# Patient Record
Sex: FEMALE | Race: WHITE | NOT HISPANIC OR LATINO | URBAN - METROPOLITAN AREA
[De-identification: names, ages, dates, MRNs, and addresses within clinical notes are randomized per-mention and may not be internally consistent; named-entity substitution may affect disease eponyms.]

---

## 2021-12-06 LAB
ABO (EXTERNAL): NORMAL
HEPATITIS B SURFACE ANTIGEN (EXTERNAL): NONREACTIVE
RH (EXTERNAL): POSITIVE
RUBELLA ANTIBODY IGG (EXTERNAL): NORMAL

## 2022-06-23 LAB — GROUP B STREPTOCOCCUS (EXTERNAL): NEGATIVE

## 2022-07-09 ENCOUNTER — HOSPITAL ENCOUNTER (INPATIENT)
Facility: CLINIC | Age: 27
LOS: 3 days | Discharge: HOME | End: 2022-07-12
Attending: SPECIALIST | Admitting: SPECIALIST
Payer: COMMERCIAL

## 2022-07-09 ENCOUNTER — ANESTHESIA EVENT (OUTPATIENT)
Dept: OBGYN | Facility: CLINIC | Age: 27
End: 2022-07-09
Payer: COMMERCIAL

## 2022-07-09 ENCOUNTER — ANESTHESIA (OUTPATIENT)
Dept: OBGYN | Facility: CLINIC | Age: 27
End: 2022-07-09
Payer: COMMERCIAL

## 2022-07-09 DIAGNOSIS — Z98.891 STATUS POST C-SECTION: ICD-10-CM

## 2022-07-09 DIAGNOSIS — O92.6 GALACTORRHEA ASSOCIATED WITH CHILDBIRTH: Primary | ICD-10-CM

## 2022-07-09 PROBLEM — Z37.9 NORMAL LABOR: Status: ACTIVE | Noted: 2022-07-09

## 2022-07-09 LAB
ABO/RH(D): NORMAL
ANTIBODY SCREEN: NEGATIVE
HGB BLD-MCNC: 12.1 G/DL (ref 11.7–15.7)
SPECIMEN EXPIRATION DATE: NORMAL

## 2022-07-09 PROCEDURE — 272N000001 HC OR GENERAL SUPPLY STERILE: Performed by: SPECIALIST

## 2022-07-09 PROCEDURE — 250N000013 HC RX MED GY IP 250 OP 250 PS 637

## 2022-07-09 PROCEDURE — 250N000013 HC RX MED GY IP 250 OP 250 PS 637: Performed by: SPECIALIST

## 2022-07-09 PROCEDURE — 250N000011 HC RX IP 250 OP 636: Performed by: ANESTHESIOLOGY

## 2022-07-09 PROCEDURE — 360N000076 HC SURGERY LEVEL 3, PER MIN: Performed by: SPECIALIST

## 2022-07-09 PROCEDURE — 250N000011 HC RX IP 250 OP 636: Performed by: NURSE ANESTHETIST, CERTIFIED REGISTERED

## 2022-07-09 PROCEDURE — 59025 FETAL NON-STRESS TEST: CPT

## 2022-07-09 PROCEDURE — 0HQ7XZZ REPAIR ABDOMEN SKIN, EXTERNAL APPROACH: ICD-10-PCS | Performed by: SPECIALIST

## 2022-07-09 PROCEDURE — 36415 COLL VENOUS BLD VENIPUNCTURE: CPT | Performed by: SPECIALIST

## 2022-07-09 PROCEDURE — G0463 HOSPITAL OUTPT CLINIC VISIT: HCPCS | Mod: 25

## 2022-07-09 PROCEDURE — 250N000011 HC RX IP 250 OP 636

## 2022-07-09 PROCEDURE — 120N000012 HC R&B POSTPARTUM

## 2022-07-09 PROCEDURE — 250N000009 HC RX 250: Performed by: SPECIALIST

## 2022-07-09 PROCEDURE — 250N000011 HC RX IP 250 OP 636: Performed by: SPECIALIST

## 2022-07-09 PROCEDURE — 710N000009 HC RECOVERY PHASE 1, LEVEL 1, PER MIN: Performed by: SPECIALIST

## 2022-07-09 PROCEDURE — 250N000009 HC RX 250: Performed by: NURSE ANESTHETIST, CERTIFIED REGISTERED

## 2022-07-09 PROCEDURE — 85018 HEMOGLOBIN: CPT | Performed by: SPECIALIST

## 2022-07-09 PROCEDURE — 999N000016 HC STATISTIC ATTENDANCE AT DELIVERY

## 2022-07-09 PROCEDURE — 258N000003 HC RX IP 258 OP 636: Performed by: NURSE ANESTHETIST, CERTIFIED REGISTERED

## 2022-07-09 PROCEDURE — 86780 TREPONEMA PALLIDUM: CPT | Performed by: SPECIALIST

## 2022-07-09 PROCEDURE — 00HU33Z INSERTION OF INFUSION DEVICE INTO SPINAL CANAL, PERCUTANEOUS APPROACH: ICD-10-PCS | Performed by: ANESTHESIOLOGY

## 2022-07-09 PROCEDURE — 258N000003 HC RX IP 258 OP 636: Performed by: SPECIALIST

## 2022-07-09 PROCEDURE — 86901 BLOOD TYPING SEROLOGIC RH(D): CPT | Performed by: SPECIALIST

## 2022-07-09 PROCEDURE — 3E0R3BZ INTRODUCTION OF ANESTHETIC AGENT INTO SPINAL CANAL, PERCUTANEOUS APPROACH: ICD-10-PCS | Performed by: ANESTHESIOLOGY

## 2022-07-09 PROCEDURE — 370N000017 HC ANESTHESIA TECHNICAL FEE, PER MIN: Performed by: SPECIALIST

## 2022-07-09 RX ORDER — OXYTOCIN 10 [USP'U]/ML
10 INJECTION, SOLUTION INTRAMUSCULAR; INTRAVENOUS
Status: DISCONTINUED | OUTPATIENT
Start: 2022-07-09 | End: 2022-07-12 | Stop reason: HOSPADM

## 2022-07-09 RX ORDER — MISOPROSTOL 200 UG/1
400 TABLET ORAL
Status: DISCONTINUED | OUTPATIENT
Start: 2022-07-09 | End: 2022-07-09 | Stop reason: HOSPADM

## 2022-07-09 RX ORDER — OXYTOCIN 10 [USP'U]/ML
10 INJECTION, SOLUTION INTRAMUSCULAR; INTRAVENOUS
Status: DISCONTINUED | OUTPATIENT
Start: 2022-07-09 | End: 2022-07-09 | Stop reason: HOSPADM

## 2022-07-09 RX ORDER — AZITHROMYCIN 500 MG/1
500 INJECTION, POWDER, LYOPHILIZED, FOR SOLUTION INTRAVENOUS
Status: COMPLETED | OUTPATIENT
Start: 2022-07-09 | End: 2022-07-09

## 2022-07-09 RX ORDER — LIDOCAINE 40 MG/G
CREAM TOPICAL
Status: DISCONTINUED | OUTPATIENT
Start: 2022-07-09 | End: 2022-07-09 | Stop reason: HOSPADM

## 2022-07-09 RX ORDER — DEXTROSE, SODIUM CHLORIDE, SODIUM LACTATE, POTASSIUM CHLORIDE, AND CALCIUM CHLORIDE 5; .6; .31; .03; .02 G/100ML; G/100ML; G/100ML; G/100ML; G/100ML
INJECTION, SOLUTION INTRAVENOUS CONTINUOUS
Status: DISCONTINUED | OUTPATIENT
Start: 2022-07-09 | End: 2022-07-12 | Stop reason: HOSPADM

## 2022-07-09 RX ORDER — ALBUTEROL SULFATE 90 UG/1
2 AEROSOL, METERED RESPIRATORY (INHALATION) EVERY 6 HOURS
COMMUNITY

## 2022-07-09 RX ORDER — MORPHINE SULFATE 1 MG/ML
INJECTION, SOLUTION EPIDURAL; INTRATHECAL; INTRAVENOUS PRN
Status: DISCONTINUED | OUTPATIENT
Start: 2022-07-09 | End: 2022-07-09

## 2022-07-09 RX ORDER — ONDANSETRON 2 MG/ML
4 INJECTION INTRAMUSCULAR; INTRAVENOUS EVERY 6 HOURS PRN
Status: DISCONTINUED | OUTPATIENT
Start: 2022-07-09 | End: 2022-07-09 | Stop reason: HOSPADM

## 2022-07-09 RX ORDER — MISOPROSTOL 200 UG/1
400 TABLET ORAL
Status: DISCONTINUED | OUTPATIENT
Start: 2022-07-09 | End: 2022-07-12 | Stop reason: HOSPADM

## 2022-07-09 RX ORDER — KETOROLAC TROMETHAMINE 30 MG/ML
30 INJECTION, SOLUTION INTRAMUSCULAR; INTRAVENOUS EVERY 6 HOURS
Status: COMPLETED | OUTPATIENT
Start: 2022-07-10 | End: 2022-07-10

## 2022-07-09 RX ORDER — PROCHLORPERAZINE 25 MG
25 SUPPOSITORY, RECTAL RECTAL EVERY 12 HOURS PRN
Status: DISCONTINUED | OUTPATIENT
Start: 2022-07-09 | End: 2022-07-09 | Stop reason: HOSPADM

## 2022-07-09 RX ORDER — ROPIVACAINE HYDROCHLORIDE 2 MG/ML
10 INJECTION, SOLUTION EPIDURAL; INFILTRATION; PERINEURAL ONCE
Status: DISCONTINUED | OUTPATIENT
Start: 2022-07-09 | End: 2022-07-09 | Stop reason: HOSPADM

## 2022-07-09 RX ORDER — PRENATAL VIT/IRON FUM/FOLIC AC 27MG-0.8MG
1 TABLET ORAL DAILY
COMMUNITY

## 2022-07-09 RX ORDER — METOCLOPRAMIDE HYDROCHLORIDE 5 MG/ML
10 INJECTION INTRAMUSCULAR; INTRAVENOUS EVERY 6 HOURS PRN
Status: DISCONTINUED | OUTPATIENT
Start: 2022-07-09 | End: 2022-07-09 | Stop reason: HOSPADM

## 2022-07-09 RX ORDER — ONDANSETRON 2 MG/ML
INJECTION INTRAMUSCULAR; INTRAVENOUS PRN
Status: DISCONTINUED | OUTPATIENT
Start: 2022-07-09 | End: 2022-07-09

## 2022-07-09 RX ORDER — METOCLOPRAMIDE 10 MG/1
10 TABLET ORAL EVERY 6 HOURS PRN
Status: DISCONTINUED | OUTPATIENT
Start: 2022-07-09 | End: 2022-07-09 | Stop reason: HOSPADM

## 2022-07-09 RX ORDER — NALOXONE HYDROCHLORIDE 0.4 MG/ML
0.2 INJECTION, SOLUTION INTRAMUSCULAR; INTRAVENOUS; SUBCUTANEOUS
Status: DISCONTINUED | OUTPATIENT
Start: 2022-07-09 | End: 2022-07-09 | Stop reason: HOSPADM

## 2022-07-09 RX ORDER — MISOPROSTOL 200 UG/1
800 TABLET ORAL
Status: DISCONTINUED | OUTPATIENT
Start: 2022-07-09 | End: 2022-07-09 | Stop reason: HOSPADM

## 2022-07-09 RX ORDER — IBUPROFEN 400 MG/1
800 TABLET, FILM COATED ORAL
Status: DISCONTINUED | OUTPATIENT
Start: 2022-07-09 | End: 2022-07-12 | Stop reason: HOSPADM

## 2022-07-09 RX ORDER — OXYTOCIN/0.9 % SODIUM CHLORIDE 30/500 ML
100-340 PLASTIC BAG, INJECTION (ML) INTRAVENOUS CONTINUOUS PRN
Status: DISCONTINUED | OUTPATIENT
Start: 2022-07-09 | End: 2022-07-12 | Stop reason: HOSPADM

## 2022-07-09 RX ORDER — METOCLOPRAMIDE HYDROCHLORIDE 5 MG/ML
10 INJECTION INTRAMUSCULAR; INTRAVENOUS EVERY 6 HOURS PRN
Status: DISCONTINUED | OUTPATIENT
Start: 2022-07-09 | End: 2022-07-12 | Stop reason: HOSPADM

## 2022-07-09 RX ORDER — OXYTOCIN/0.9 % SODIUM CHLORIDE 30/500 ML
340 PLASTIC BAG, INJECTION (ML) INTRAVENOUS CONTINUOUS PRN
Status: DISCONTINUED | OUTPATIENT
Start: 2022-07-09 | End: 2022-07-12 | Stop reason: HOSPADM

## 2022-07-09 RX ORDER — TRANEXAMIC ACID 10 MG/ML
1 INJECTION, SOLUTION INTRAVENOUS EVERY 30 MIN PRN
Status: DISCONTINUED | OUTPATIENT
Start: 2022-07-09 | End: 2022-07-12 | Stop reason: HOSPADM

## 2022-07-09 RX ORDER — LIDOCAINE 40 MG/G
CREAM TOPICAL
Status: DISCONTINUED | OUTPATIENT
Start: 2022-07-09 | End: 2022-07-12 | Stop reason: HOSPADM

## 2022-07-09 RX ORDER — KETOROLAC TROMETHAMINE 30 MG/ML
30 INJECTION, SOLUTION INTRAMUSCULAR; INTRAVENOUS
Status: DISCONTINUED | OUTPATIENT
Start: 2022-07-09 | End: 2022-07-12 | Stop reason: HOSPADM

## 2022-07-09 RX ORDER — MODIFIED LANOLIN
OINTMENT (GRAM) TOPICAL
Status: DISCONTINUED | OUTPATIENT
Start: 2022-07-09 | End: 2022-07-12 | Stop reason: HOSPADM

## 2022-07-09 RX ORDER — CEFAZOLIN SODIUM 2 G/100ML
2 INJECTION, SOLUTION INTRAVENOUS SEE ADMIN INSTRUCTIONS
Status: DISCONTINUED | OUTPATIENT
Start: 2022-07-09 | End: 2022-07-09 | Stop reason: HOSPADM

## 2022-07-09 RX ORDER — ONDANSETRON 4 MG/1
4 TABLET, ORALLY DISINTEGRATING ORAL EVERY 6 HOURS PRN
Status: DISCONTINUED | OUTPATIENT
Start: 2022-07-09 | End: 2022-07-09 | Stop reason: HOSPADM

## 2022-07-09 RX ORDER — OXYTOCIN/0.9 % SODIUM CHLORIDE 30/500 ML
340 PLASTIC BAG, INJECTION (ML) INTRAVENOUS CONTINUOUS PRN
Status: DISCONTINUED | OUTPATIENT
Start: 2022-07-09 | End: 2022-07-09 | Stop reason: HOSPADM

## 2022-07-09 RX ORDER — NALOXONE HYDROCHLORIDE 0.4 MG/ML
0.4 INJECTION, SOLUTION INTRAMUSCULAR; INTRAVENOUS; SUBCUTANEOUS
Status: DISCONTINUED | OUTPATIENT
Start: 2022-07-09 | End: 2022-07-12 | Stop reason: HOSPADM

## 2022-07-09 RX ORDER — METHYLERGONOVINE MALEATE 0.2 MG/ML
200 INJECTION INTRAVENOUS
Status: DISCONTINUED | OUTPATIENT
Start: 2022-07-09 | End: 2022-07-12 | Stop reason: HOSPADM

## 2022-07-09 RX ORDER — TRANEXAMIC ACID 10 MG/ML
1 INJECTION, SOLUTION INTRAVENOUS EVERY 30 MIN PRN
Status: DISCONTINUED | OUTPATIENT
Start: 2022-07-09 | End: 2022-07-09 | Stop reason: HOSPADM

## 2022-07-09 RX ORDER — AMOXICILLIN 250 MG
2 CAPSULE ORAL 2 TIMES DAILY
Status: DISCONTINUED | OUTPATIENT
Start: 2022-07-09 | End: 2022-07-12 | Stop reason: HOSPADM

## 2022-07-09 RX ORDER — FENTANYL CITRATE 50 UG/ML
INJECTION, SOLUTION INTRAMUSCULAR; INTRAVENOUS
Status: COMPLETED
Start: 2022-07-09 | End: 2022-07-09

## 2022-07-09 RX ORDER — FENTANYL CITRATE 50 UG/ML
50-100 INJECTION, SOLUTION INTRAMUSCULAR; INTRAVENOUS
Status: DISCONTINUED | OUTPATIENT
Start: 2022-07-09 | End: 2022-07-12 | Stop reason: HOSPADM

## 2022-07-09 RX ORDER — PROCHLORPERAZINE 25 MG
25 SUPPOSITORY, RECTAL RECTAL EVERY 12 HOURS PRN
Status: DISCONTINUED | OUTPATIENT
Start: 2022-07-09 | End: 2022-07-12 | Stop reason: HOSPADM

## 2022-07-09 RX ORDER — CARBOPROST TROMETHAMINE 250 UG/ML
250 INJECTION, SOLUTION INTRAMUSCULAR
Status: DISCONTINUED | OUTPATIENT
Start: 2022-07-09 | End: 2022-07-12 | Stop reason: HOSPADM

## 2022-07-09 RX ORDER — CITRIC ACID/SODIUM CITRATE 334-500MG
30 SOLUTION, ORAL ORAL
Status: COMPLETED | OUTPATIENT
Start: 2022-07-09 | End: 2022-07-09

## 2022-07-09 RX ORDER — AZITHROMYCIN 500 MG/1
INJECTION, POWDER, LYOPHILIZED, FOR SOLUTION INTRAVENOUS
Status: DISCONTINUED
Start: 2022-07-09 | End: 2022-07-09 | Stop reason: HOSPADM

## 2022-07-09 RX ORDER — METHYLERGONOVINE MALEATE 0.2 MG/ML
200 INJECTION INTRAVENOUS
Status: DISCONTINUED | OUTPATIENT
Start: 2022-07-09 | End: 2022-07-09 | Stop reason: HOSPADM

## 2022-07-09 RX ORDER — ACETAMINOPHEN 325 MG/1
975 TABLET ORAL EVERY 6 HOURS
Status: DISCONTINUED | OUTPATIENT
Start: 2022-07-09 | End: 2022-07-12 | Stop reason: HOSPADM

## 2022-07-09 RX ORDER — LIDOCAINE HCL/EPINEPHRINE/PF 2%-1:200K
VIAL (ML) INJECTION PRN
Status: DISCONTINUED | OUTPATIENT
Start: 2022-07-09 | End: 2022-07-09

## 2022-07-09 RX ORDER — NALOXONE HYDROCHLORIDE 0.4 MG/ML
0.4 INJECTION, SOLUTION INTRAMUSCULAR; INTRAVENOUS; SUBCUTANEOUS
Status: DISCONTINUED | OUTPATIENT
Start: 2022-07-09 | End: 2022-07-09 | Stop reason: HOSPADM

## 2022-07-09 RX ORDER — ROPIVACAINE HYDROCHLORIDE 2 MG/ML
INJECTION, SOLUTION EPIDURAL; INFILTRATION; PERINEURAL
Status: COMPLETED | OUTPATIENT
Start: 2022-07-09 | End: 2022-07-09

## 2022-07-09 RX ORDER — CEFAZOLIN SODIUM 2 G/100ML
INJECTION, SOLUTION INTRAVENOUS
Status: DISCONTINUED
Start: 2022-07-09 | End: 2022-07-09 | Stop reason: HOSPADM

## 2022-07-09 RX ORDER — HYDROCORTISONE 25 MG/G
CREAM TOPICAL 3 TIMES DAILY PRN
Status: DISCONTINUED | OUTPATIENT
Start: 2022-07-09 | End: 2022-07-12 | Stop reason: HOSPADM

## 2022-07-09 RX ORDER — FAMOTIDINE 20 MG/1
20 TABLET, FILM COATED ORAL 2 TIMES DAILY
COMMUNITY

## 2022-07-09 RX ORDER — NALOXONE HYDROCHLORIDE 0.4 MG/ML
0.2 INJECTION, SOLUTION INTRAMUSCULAR; INTRAVENOUS; SUBCUTANEOUS
Status: DISCONTINUED | OUTPATIENT
Start: 2022-07-09 | End: 2022-07-12 | Stop reason: HOSPADM

## 2022-07-09 RX ORDER — HYDROMORPHONE HCL IN WATER/PF 6 MG/30 ML
.3-.5 PATIENT CONTROLLED ANALGESIA SYRINGE INTRAVENOUS EVERY 30 MIN PRN
Status: DISCONTINUED | OUTPATIENT
Start: 2022-07-09 | End: 2022-07-12 | Stop reason: HOSPADM

## 2022-07-09 RX ORDER — ONDANSETRON 2 MG/ML
4 INJECTION INTRAMUSCULAR; INTRAVENOUS EVERY 6 HOURS PRN
Status: DISCONTINUED | OUTPATIENT
Start: 2022-07-09 | End: 2022-07-12 | Stop reason: HOSPADM

## 2022-07-09 RX ORDER — PROCHLORPERAZINE MALEATE 5 MG
10 TABLET ORAL EVERY 6 HOURS PRN
Status: DISCONTINUED | OUTPATIENT
Start: 2022-07-09 | End: 2022-07-09 | Stop reason: HOSPADM

## 2022-07-09 RX ORDER — ACETAMINOPHEN 325 MG/1
TABLET ORAL
Status: COMPLETED
Start: 2022-07-09 | End: 2022-07-09

## 2022-07-09 RX ORDER — ONDANSETRON 4 MG/1
4 TABLET, ORALLY DISINTEGRATING ORAL EVERY 6 HOURS PRN
Status: DISCONTINUED | OUTPATIENT
Start: 2022-07-09 | End: 2022-07-12 | Stop reason: HOSPADM

## 2022-07-09 RX ORDER — CEFAZOLIN SODIUM 2 G/100ML
2 INJECTION, SOLUTION INTRAVENOUS
Status: COMPLETED | OUTPATIENT
Start: 2022-07-09 | End: 2022-07-09

## 2022-07-09 RX ORDER — FENTANYL CITRATE 50 UG/ML
INJECTION, SOLUTION INTRAMUSCULAR; INTRAVENOUS
Status: COMPLETED | OUTPATIENT
Start: 2022-07-09 | End: 2022-07-09

## 2022-07-09 RX ORDER — AMOXICILLIN 250 MG
1 CAPSULE ORAL 2 TIMES DAILY
Status: DISCONTINUED | OUTPATIENT
Start: 2022-07-09 | End: 2022-07-12 | Stop reason: HOSPADM

## 2022-07-09 RX ORDER — IBUPROFEN 400 MG/1
800 TABLET, FILM COATED ORAL EVERY 6 HOURS PRN
Status: DISCONTINUED | OUTPATIENT
Start: 2022-07-11 | End: 2022-07-12 | Stop reason: HOSPADM

## 2022-07-09 RX ORDER — EPHEDRINE SULFATE 50 MG/ML
5 INJECTION, SOLUTION INTRAMUSCULAR; INTRAVENOUS; SUBCUTANEOUS
Status: DISCONTINUED | OUTPATIENT
Start: 2022-07-09 | End: 2022-07-09 | Stop reason: HOSPADM

## 2022-07-09 RX ORDER — CARBOPROST TROMETHAMINE 250 UG/ML
250 INJECTION, SOLUTION INTRAMUSCULAR
Status: DISCONTINUED | OUTPATIENT
Start: 2022-07-09 | End: 2022-07-09 | Stop reason: HOSPADM

## 2022-07-09 RX ORDER — MISOPROSTOL 200 UG/1
800 TABLET ORAL
Status: DISCONTINUED | OUTPATIENT
Start: 2022-07-09 | End: 2022-07-12 | Stop reason: HOSPADM

## 2022-07-09 RX ORDER — METOCLOPRAMIDE 10 MG/1
10 TABLET ORAL EVERY 6 HOURS PRN
Status: DISCONTINUED | OUTPATIENT
Start: 2022-07-09 | End: 2022-07-12 | Stop reason: HOSPADM

## 2022-07-09 RX ORDER — OXYCODONE HYDROCHLORIDE 5 MG/1
5 TABLET ORAL EVERY 4 HOURS PRN
Status: DISCONTINUED | OUTPATIENT
Start: 2022-07-09 | End: 2022-07-12 | Stop reason: HOSPADM

## 2022-07-09 RX ORDER — SIMETHICONE 80 MG
80 TABLET,CHEWABLE ORAL 4 TIMES DAILY PRN
Status: DISCONTINUED | OUTPATIENT
Start: 2022-07-09 | End: 2022-07-12 | Stop reason: HOSPADM

## 2022-07-09 RX ORDER — FENTANYL CITRATE 50 UG/ML
100 INJECTION, SOLUTION INTRAMUSCULAR; INTRAVENOUS ONCE
Status: DISCONTINUED | OUTPATIENT
Start: 2022-07-09 | End: 2022-07-09 | Stop reason: HOSPADM

## 2022-07-09 RX ORDER — ACETAMINOPHEN 325 MG/1
650 TABLET ORAL EVERY 4 HOURS PRN
Status: DISCONTINUED | OUTPATIENT
Start: 2022-07-12 | End: 2022-07-12 | Stop reason: HOSPADM

## 2022-07-09 RX ORDER — CITRIC ACID/SODIUM CITRATE 334-500MG
30 SOLUTION, ORAL ORAL
Status: DISCONTINUED | OUTPATIENT
Start: 2022-07-09 | End: 2022-07-09 | Stop reason: HOSPADM

## 2022-07-09 RX ORDER — PROCHLORPERAZINE MALEATE 10 MG
10 TABLET ORAL EVERY 6 HOURS PRN
Status: DISCONTINUED | OUTPATIENT
Start: 2022-07-09 | End: 2022-07-12 | Stop reason: HOSPADM

## 2022-07-09 RX ORDER — MINOCYCLINE HYDROCHLORIDE 100 MG/1
100 TABLET ORAL 2 TIMES DAILY
Status: ON HOLD | COMMUNITY
End: 2022-07-12

## 2022-07-09 RX ORDER — BISACODYL 10 MG
10 SUPPOSITORY, RECTAL RECTAL DAILY PRN
Status: DISCONTINUED | OUTPATIENT
Start: 2022-07-11 | End: 2022-07-12 | Stop reason: HOSPADM

## 2022-07-09 RX ORDER — SODIUM CHLORIDE, SODIUM LACTATE, POTASSIUM CHLORIDE, CALCIUM CHLORIDE 600; 310; 30; 20 MG/100ML; MG/100ML; MG/100ML; MG/100ML
INJECTION, SOLUTION INTRAVENOUS CONTINUOUS
Status: DISCONTINUED | OUTPATIENT
Start: 2022-07-09 | End: 2022-07-09 | Stop reason: HOSPADM

## 2022-07-09 RX ORDER — NALBUPHINE HYDROCHLORIDE 10 MG/ML
2.5-5 INJECTION, SOLUTION INTRAMUSCULAR; INTRAVENOUS; SUBCUTANEOUS EVERY 6 HOURS PRN
Status: DISCONTINUED | OUTPATIENT
Start: 2022-07-09 | End: 2022-07-12 | Stop reason: HOSPADM

## 2022-07-09 RX ADMIN — SODIUM CHLORIDE, SODIUM LACTATE, POTASSIUM CHLORIDE, CALCIUM CHLORIDE AND DEXTROSE MONOHYDRATE: 5; 600; 310; 30; 20 INJECTION, SOLUTION INTRAVENOUS at 23:22

## 2022-07-09 RX ADMIN — CEFAZOLIN SODIUM 2 G: 2 INJECTION, SOLUTION INTRAVENOUS at 19:05

## 2022-07-09 RX ADMIN — ONDANSETRON 4 MG: 2 INJECTION INTRAMUSCULAR; INTRAVENOUS at 19:18

## 2022-07-09 RX ADMIN — PHENYLEPHRINE HYDROCHLORIDE 0.3 MCG/KG/MIN: 10 INJECTION INTRAVENOUS at 19:05

## 2022-07-09 RX ADMIN — SODIUM CHLORIDE, POTASSIUM CHLORIDE, SODIUM LACTATE AND CALCIUM CHLORIDE 500 ML: 600; 310; 30; 20 INJECTION, SOLUTION INTRAVENOUS at 17:00

## 2022-07-09 RX ADMIN — ACETAMINOPHEN 975 MG: 325 TABLET ORAL at 22:12

## 2022-07-09 RX ADMIN — FENTANYL CITRATE 100 MCG: 50 INJECTION, SOLUTION INTRAMUSCULAR; INTRAVENOUS at 17:17

## 2022-07-09 RX ADMIN — SODIUM CHLORIDE, POTASSIUM CHLORIDE, SODIUM LACTATE AND CALCIUM CHLORIDE: 600; 310; 30; 20 INJECTION, SOLUTION INTRAVENOUS at 18:34

## 2022-07-09 RX ADMIN — Medication 340 ML/HR: at 19:21

## 2022-07-09 RX ADMIN — PHENYLEPHRINE HYDROCHLORIDE 100 MCG: 10 INJECTION INTRAVENOUS at 19:40

## 2022-07-09 RX ADMIN — Medication: at 17:48

## 2022-07-09 RX ADMIN — LIDOCAINE HYDROCHLORIDE,EPINEPHRINE BITARTRATE 5 ML: 20; .005 INJECTION, SOLUTION EPIDURAL; INFILTRATION; INTRACAUDAL; PERINEURAL at 19:06

## 2022-07-09 RX ADMIN — PHENYLEPHRINE HYDROCHLORIDE 100 MCG: 10 INJECTION INTRAVENOUS at 19:52

## 2022-07-09 RX ADMIN — FENTANYL CITRATE 100 MCG: 50 INJECTION, SOLUTION INTRAMUSCULAR; INTRAVENOUS at 17:55

## 2022-07-09 RX ADMIN — LIDOCAINE HYDROCHLORIDE,EPINEPHRINE BITARTRATE 10 ML: 20; .005 INJECTION, SOLUTION EPIDURAL; INFILTRATION; INTRACAUDAL; PERINEURAL at 18:57

## 2022-07-09 RX ADMIN — AZITHROMYCIN MONOHYDRATE 500 MG: 500 INJECTION, POWDER, LYOPHILIZED, FOR SOLUTION INTRAVENOUS at 18:46

## 2022-07-09 RX ADMIN — ROPIVACAINE HYDROCHLORIDE 10 ML: 2 INJECTION, SOLUTION EPIDURAL; INFILTRATION at 17:55

## 2022-07-09 RX ADMIN — AZITHROMYCIN 500 MG: 500 INJECTION, POWDER, LYOPHILIZED, FOR SOLUTION INTRAVENOUS at 18:46

## 2022-07-09 RX ADMIN — MORPHINE SULFATE 2 MG: 1 INJECTION, SOLUTION EPIDURAL; INTRATHECAL; INTRAVENOUS at 19:23

## 2022-07-09 RX ADMIN — SODIUM CITRATE AND CITRIC ACID MONOHYDRATE 30 ML: 500; 334 SOLUTION ORAL at 18:47

## 2022-07-09 ASSESSMENT — ACTIVITIES OF DAILY LIVING (ADL)
ADLS_ACUITY_SCORE: 31

## 2022-07-09 NOTE — PROGRESS NOTES
Patient received labor epidural and then SVE performed which revealed breech presentation.  Confirmed by ultrasound.  Patient's cervix is dilated to a rim.    I was called to assess the patient and I recommended a  birth.  The risks and benefits were discussed including but not limited to bleeding, infection, transfusion, injury to other organs, implications on future pregnancies. Informed consent obtained.    Heaven Moya MD ....................  2022   7:00 PM , pager: [phone]

## 2022-07-09 NOTE — H&P
OB ADMISSION NOTE    CHIEF COMPLAINT:   Presents to labor and delivery with SROM and painful contractions.    HISTORY OF PRESENT ILLNESS:  26 yo  at 38+5 weeks presents with SROM and painful contractions.  Her pregnancy was uncomplicated.  Her medical history includes MRSA colonized (3 positive tests).  She is on a klever chol of nasal minocycline and Hibiclens shower once a month. 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  Patient had COVID in mid-.    OBSTETRICAL / DATING HISTORY:  Estimated Date of Delivery: 2022  Gestational Age:  38w5d    OB History    Para Term  AB Living   1 0 0 0 0 0   SAB IAB Ectopic Multiple Live Births   0 0 0 0 0      # Outcome Date GA Lbr Wong/2nd Weight Sex Delivery Anes PTL Lv   1 Current                 LAB TESTING:  See prenatal records.      PAST MEDICAL HISTORY:  Past Medical History:   Diagnosis Date     Depressive disorder      Uncomplicated asthma        PAST SURGICAL HISTORY:  Past Surgical History:   Procedure Laterality Date     EYE SURGERY      Right eye surgery, blind in right eye       ALLERGIES: NKA    HABITS:  No tobacco/etoh/drugs    HISTORY OF PRESENT ILLNESS:    (Please see scanned  sheets for prenatal history. Examination at the time of admission revealed no interval change in the patient s history or physical exam except as described below.)    See above     REVIEW OF SYSTEMS:  NEUROLOGIC:  Negative, no headaches or visual changes  EYES:  Negative, no visual changes  ENT:  Negative, no ear pain,no loss of hearing.  GI:  Negative, no diarrhea or constipation  BREAST:  Negative, no breast pain  :  Negative, no dysuria  GYN:  Negative, no unusual vaginal discharge except amniotic fluid  CV:  Negative, no chest pain  PULMONARY:  Negative, no shortness of breath  MUSCULOSKELETAL:  Negative. No limb pain  PSYCH:  Negative, good mood  PHYSICAL EXAM:   /72   Temp 98.4  F (36.9  C)    "Ht 1.676 m (5' 6\")   Wt 73 kg (161 lb)   BMI 25.99 kg/m    Gen:  NAD, nontoxic, alert and oriented X 3  CV: regular rate, no murmurs  RESP:  Unlabored breathing, clear  ABDOMEN: gravid, nontender, no organomegaly appreciated  Membrane Status:  ruptured  Fetal Presentation:  vertex  Cervix:  3/90/0  GBS:  negative  EFW:  7-8#    Fetal heart tones: category 1 tracing  TOCO: irregular contractions    Impression:  IUP at 38w5d with with SROM and early labor  Patient Active Problem List   Diagnosis     Labor and delivery, indication for care     Normal labor       Plan:  Admission  Epidural when desired  Anticipate   Isolation precautions for MRSA  COVID - has had COVID within the last 90 days so no testing required.    Heaven Moya MD ....................  2022   4:45 PM , pager: [phone]    One hour spent on admission ... 30 minutes face to face and 30 minutes on documentation.    "

## 2022-07-09 NOTE — PLAN OF CARE
" presents at 38 5/7 weeks gestation after being diverted here from [facility]  c/o \"leaking fluid and contractions since 0500 \". External monitors applied after verbal consent by patient. Admission database obtained per patient and spouse. Patient signed consent to obtain prenatal records from [facility]. Vital signs obtained and WDL. Patient and spouse oriented to room, call light and plan of care while in the MAC.  "

## 2022-07-09 NOTE — PLAN OF CARE
All belongings gathered and taken by spouse. Patient and spouse escorted to room 220 for admission to labor and delivery. SBAR report to Caren CHAPPELL RN to asssume all cares for this patient.

## 2022-07-09 NOTE — PROVIDER NOTIFICATION
07/09/22 1545   Provider Notification   Provider Name/Title Dr. Moya   Method of Notification At Bedside   Request Evaluate in Person   Notification Reason Labor Status;Uterine Activity;Pain;Bleeding;SVE;Status Update   Patient to be admitted to labor and delivery.

## 2022-07-09 NOTE — ANESTHESIA PREPROCEDURE EVALUATION
Anesthesia Pre-Procedure Evaluation    Patient: Chandni Ryan   MRN: [id number] : 1995        Procedure :           Past Medical History:   Diagnosis Date     Depressive disorder      Uncomplicated asthma       Past Surgical History:   Procedure Laterality Date     EYE SURGERY      Right eye surgery, blind in right eye      No Known Allergies   Social History     Tobacco Use     Smoking status: Never Smoker     Smokeless tobacco: Never Used   Substance Use Topics     Alcohol use: Not Currently      Wt Readings from Last 1 Encounters:   22 73 kg (161 lb)        Anesthesia Evaluation            ROS/MED HX  ENT/Pulmonary:    (-) asthma   Neurologic:  - neg neurologic ROS     Cardiovascular:    (-) PIH   METS/Exercise Tolerance:     Hematologic:     (+) no anticoagulation therapy, no coagulopathy,     Musculoskeletal:       GI/Hepatic:     (+) GERD,     Renal/Genitourinary:       Endo:       Psychiatric/Substance Use:       Infectious Disease:       Malignancy:       Other:            Physical Exam    Airway        Mallampati: II   TM distance: > 3 FB   Neck ROM: full     Respiratory Devices and Support         Dental  no notable dental history         Cardiovascular   cardiovascular exam normal          Pulmonary   pulmonary exam normal                OUTSIDE LABS:  CBC:   Lab Results   Component Value Date    HGB 12.1 2022     BMP: No results found for: NA, POTASSIUM, CHLORIDE, CO2, BUN, CR, GLC  COAGS: No results found for: PTT, INR, FIBR  POC: No results found for: BGM, HCG, HCGS  HEPATIC: No results found for: ALBUMIN, PROTTOTAL, ALT, AST, GGT, ALKPHOS, BILITOTAL, BILIDIRECT, AJMIE  OTHER: No results found for: PH, LACT, A1C, FILIPE, PHOS, MAG, LIPASE, AMYLASE, TSH, T4, T3, CRP, SED    Anesthesia Plan    ASA Status:  2      Anesthesia Type: Epidural.              Consents    Anesthesia Plan(s) and associated risks, benefits, and realistic alternatives discussed. Questions answered and  patient/representative(s) expressed understanding.    - Discussed:     - Discussed with:  Patient         Postoperative Care            Comments:    Other Comments: Orders to manage the epidural infusion have been entered, and through coordination with the nurse, we will continute to manage and monitor the patient's labor epidural.  We will continuously be available to adjust as needed thruout the entire L&D process.             Alma Rosa Long MD, MD

## 2022-07-09 NOTE — ANESTHESIA PROCEDURE NOTES
Epidural catheter Procedure Note    Pre-Procedure   Staff -        Anesthesiologist:  Alma Rosa Long MD       Performed By: anesthesiologist       Location: OB       Pre-Anesthestic Checklist: patient identified, IV checked, risks and benefits discussed, informed consent, monitors and equipment checked, pre-op evaluation and at physician/surgeon's request  Timeout:       Correct Patient: Yes        Correct Procedure: Yes        Correct Site: Yes        Correct Position: Yes   Procedure Documentation  Procedure: epidural catheter       Patient Position: sitting       Patient Prep/Sterile Barriers: sterile gloves, mask, patient draped       Skin prep: Betadine       Local skin infiltrated with 3 mL of 1% lidocaine.        Insertion Site: L3-4. (midline approach).       Technique: LORT saline        OLINDA at 5 cm.       Needle Type: [employer] needle       Needle Gauge: 17.        Needle Length (Inches): 3.5        Catheter: 19 G.          Catheter threaded easily.         4 cm epidural space.         Threaded 9 cm at skin.         # of attempts: 1 and  # of redirects:          : 0.    Assessment/Narrative         Paresthesias: No.       Test dose of 3 mL lidocaine 1.5% w/ 1:200,000 epinephrine at.         Test dose negative, 3 minutes after injection, for signs of intravascular, subdural, or intrathecal injection.       Insertion/Infusion Method: LORT saline       Aspiration negative for Heme or CSF via Epidural Catheter.    Medication(s) Administered   0.2% Ropivacaine (Epidural) - EPIDURAL   10 mL - 7/9/2022 5:55:00 PM  Fentanyl PF (Epidural) - EPIDURAL   100 mcg - 7/9/2022 5:55:00 PM   Comments:  Pt tolerated well.   Immediately to supine with STFEFANY.   FHTs stable post-procedure.   No complications.

## 2022-07-10 LAB
HGB BLD-MCNC: 9.4 G/DL (ref 11.7–15.7)
T PALLIDUM AB SER QL: NONREACTIVE

## 2022-07-10 PROCEDURE — 250N000011 HC RX IP 250 OP 636: Performed by: SPECIALIST

## 2022-07-10 PROCEDURE — 120N000012 HC R&B POSTPARTUM

## 2022-07-10 PROCEDURE — 85018 HEMOGLOBIN: CPT | Performed by: SPECIALIST

## 2022-07-10 PROCEDURE — 36415 COLL VENOUS BLD VENIPUNCTURE: CPT | Performed by: SPECIALIST

## 2022-07-10 PROCEDURE — 250N000013 HC RX MED GY IP 250 OP 250 PS 637: Performed by: SPECIALIST

## 2022-07-10 RX ORDER — DIPHENHYDRAMINE HCL 25 MG
25 CAPSULE ORAL EVERY 6 HOURS PRN
Status: DISCONTINUED | OUTPATIENT
Start: 2022-07-10 | End: 2022-07-12 | Stop reason: HOSPADM

## 2022-07-10 RX ORDER — CETIRIZINE HYDROCHLORIDE 5 MG/1
5 TABLET ORAL DAILY
Status: DISCONTINUED | OUTPATIENT
Start: 2022-07-10 | End: 2022-07-12 | Stop reason: HOSPADM

## 2022-07-10 RX ADMIN — DIPHENHYDRAMINE HYDROCHLORIDE 25 MG: 25 CAPSULE ORAL at 10:40

## 2022-07-10 RX ADMIN — KETOROLAC TROMETHAMINE 30 MG: 30 INJECTION, SOLUTION INTRAMUSCULAR at 19:47

## 2022-07-10 RX ADMIN — SENNOSIDES AND DOCUSATE SODIUM 1 TABLET: 50; 8.6 TABLET ORAL at 08:48

## 2022-07-10 RX ADMIN — ACETAMINOPHEN 975 MG: 325 TABLET ORAL at 22:21

## 2022-07-10 RX ADMIN — ACETAMINOPHEN 975 MG: 325 TABLET ORAL at 10:40

## 2022-07-10 RX ADMIN — KETOROLAC TROMETHAMINE 30 MG: 30 INJECTION, SOLUTION INTRAMUSCULAR at 02:42

## 2022-07-10 RX ADMIN — ACETAMINOPHEN 975 MG: 325 TABLET ORAL at 16:41

## 2022-07-10 RX ADMIN — ONDANSETRON 4 MG: 2 INJECTION INTRAMUSCULAR; INTRAVENOUS at 10:34

## 2022-07-10 RX ADMIN — SENNOSIDES AND DOCUSATE SODIUM 1 TABLET: 50; 8.6 TABLET ORAL at 19:46

## 2022-07-10 RX ADMIN — KETOROLAC TROMETHAMINE 30 MG: 30 INJECTION, SOLUTION INTRAMUSCULAR at 08:48

## 2022-07-10 RX ADMIN — KETOROLAC TROMETHAMINE 30 MG: 30 INJECTION, SOLUTION INTRAMUSCULAR at 15:00

## 2022-07-10 RX ADMIN — ACETAMINOPHEN 975 MG: 325 TABLET ORAL at 04:15

## 2022-07-10 ASSESSMENT — ACTIVITIES OF DAILY LIVING (ADL)
WEAR_GLASSES_OR_BLIND: YES
ADLS_ACUITY_SCORE: 35
DOING_ERRANDS_INDEPENDENTLY_DIFFICULTY: NO
ADLS_ACUITY_SCORE: 35
TOILETING_ISSUES: NO
ADLS_ACUITY_SCORE: 20
ADLS_ACUITY_SCORE: 35
ADLS_ACUITY_SCORE: 20
DIFFICULTY_EATING/SWALLOWING: NO
ADLS_ACUITY_SCORE: 35
CONCENTRATING,_REMEMBERING_OR_MAKING_DECISIONS_DIFFICULTY: NO
ADLS_ACUITY_SCORE: 20
VISION_MANAGEMENT: GLASSES
ADLS_ACUITY_SCORE: 35
DIFFICULTY_COMMUNICATING: NO
ADLS_ACUITY_SCORE: 20
ADLS_ACUITY_SCORE: 35
HEARING_DIFFICULTY_OR_DEAF: NO
CHANGE_IN_FUNCTIONAL_STATUS_SINCE_ONSET_OF_CURRENT_ILLNESS/INJURY: NO
ADLS_ACUITY_SCORE: 31
FALL_HISTORY_WITHIN_LAST_SIX_MONTHS: NO
DRESSING/BATHING_DIFFICULTY: NO
ADLS_ACUITY_SCORE: 20
WALKING_OR_CLIMBING_STAIRS_DIFFICULTY: NO

## 2022-07-10 NOTE — PLAN OF CARE
Pt presents to labor and delivery from MAC, uncomfortable requesting epidural.  Category 1 tracing.  IV placed in right hand.  IV fentanyl 100mcg given to patient for pain.  Fluid bolus given.  Anes to bedside for epidural. Consents signed, timeout callled.  Ropivicaine and fentanyl given to Dr Long by handoff.  Epidural placed with good pain relief.   Mcmahan cath placed.  SVE 9-10cm.  ? Breech presentation.  Dr Drew called to bedside for ultrasound.  Confirmed breech.  Dr Moya called to hospital for c/s.  Pt prepped per protocol for c/s.  Consents signed.  Pt and spouse in agreement with POC.  Pt moved to OR via bed.  Pt prepped per protocol for c/s.  Report given to CHRISTOPH Tariq taking over.

## 2022-07-10 NOTE — PROVIDER NOTIFICATION
Drainage noted on left side of dressing during OR recovery. Drainage was marked and borders kept extending. Pressure applied with abd dressing and island dressing became saturated. Island dressing removed and pressure applied to incision with sterile abd dressing. Left side of incision noted to have moderate amts of serosanguinous drainage. Dr. Moya notified of above and plans to come in to assess. Plan to keep pt in recovery area until then. Pt and  updated on plan of care.

## 2022-07-10 NOTE — PLAN OF CARE
Patient admitted into room 433 per cart at 2345 accompanied by , , and L&D RN. Oriented to room, call light, safety measures, and postpartum routines. Verified  bands with parents and L&D RN. Plan of care and teaching initiated, answered questions.Patient is in MSRA isolation.

## 2022-07-10 NOTE — PLAN OF CARE
Pt up with SBA to the bathroom. She denies dizziness and was steady on her feet. Abdominal precautions reviewed.     Pt was unable to void at this time. Pt states she doesn't feel an urge either.      Mcmahan catheter was removed at 0620 with 750cc of urine emptied and IV was saline locked by previous RN.     Pt has had some nausea this morning, received IV Zofran and taken in minimal PO.  0830 dose of IV Toradol was given before pt was due to void.     Bladder scanning showed approximately 190cc of urine in the bladder. Pt encouraged to push PO fluids since her nausea has resolved.

## 2022-07-10 NOTE — ANESTHESIA POSTPROCEDURE EVALUATION
Patient: Chandni Ryan    Procedure: Procedure(s):   SECTION       Anesthesia Type:  Epidural    Note:     Postop Pain Control: Uneventful            Sign Out: Well controlled pain   PONV: No   Neuro/Psych: Uneventful            Sign Out: Acceptable/Baseline neuro status   Airway/Respiratory: Uneventful            Sign Out: Acceptable/Baseline resp. status   CV/Hemodynamics: Uneventful            Sign Out: Acceptable CV status; No obvious hypovolemia; No obvious fluid overload   Other NRE: NONE   DID A NON-ROUTINE EVENT OCCUR? No           Last vitals:  Vitals Value Taken Time   /62 07/10/22 0515   Temp 36.8  C (98.2  F) 07/10/22 0250   Pulse 64 07/10/22 0515   Resp 16 07/10/22 0515   SpO2 98 % 07/10/22 0515       Electronically Signed By: Alma Rosa Long MD, MD  July 10, 2022  7:00 AM

## 2022-07-10 NOTE — PLAN OF CARE
VSS, breastfeeding, fundus is firm at U/1, scant flow, no clots.  Incision pressure dressing is CDI.  Pain controlled with Tylenol and Toradol, abdominal binder is also in place.  Due to void, encouraging PO fluids and relaxation.  SBA with activity.   is at bedside and supportive.  Will continue to monitor and support.

## 2022-07-10 NOTE — PROGRESS NOTES
"POST PARTUM NOTE,  SECTION    POST-OP DAY 1:  Delivery    The patient feels well.   Pain is well controlled with current medications.   /68 (BP Location: Left arm, Patient Position: Semi-Pedroza's, Cuff Size: Adult Regular)   Pulse 57   Temp 98.2  F (36.8  C) (Oral)   Resp 16   Ht 1.676 m (5' 6\")   Wt 73 kg (161 lb)   SpO2 98%   Breastfeeding Unknown   BMI 25.99 kg/m  ,     Hemoglobin   Date Value Ref Range Status   07/10/2022 9.4 (L) 11.7 - 15.7 g/dL Final       Intake/Output Summary (Last 24 hours) at 7/10/2022 1000  Last data filed at 7/10/2022 0620  Gross per 24 hour   Intake 1134 ml   Output 1780 ml   Net -646 ml         The amount and color of the lochia is appropriate for the duration of recovery.  The uterine fundus is firm, at umbilicus.   The incision is in a clean, dry dressing.   Extremities are not edematous   The patient is ambulating well.  The patient is tolerating a normal diet.  Flatus has been passed.     Impression:   Active Problems:    Labor and delivery, indication for care    Normal labor    Breech presentation of fetus palpable vaginally    Status post      Normal Post-operative course.    Plan:  Continue current plan    Heaven Moya MD  "

## 2022-07-10 NOTE — PLAN OF CARE
Vital signs stable, afebrile, O2 sats % on RA, voiding per avila and plan is to ambulate to bathroom at 0615 and UCO, pressure dressing is CDI, FFU/1-2 scant rubra lochia, given an abdominal binder for incisional support prn, IVF continue at 125cc/hr and will SL at 0615, trace LE edema, lungs clear, BS+ passing flatus, advance to a regular diet, able to ambulate free of dizziness, able to rest, pain controlled with medications, ice pack to left of incision above dressing, breast feeding  skin-to-skin on demand.  is here and is very supportive.

## 2022-07-10 NOTE — PLAN OF CARE
Chandni got up to the bathroom with SBA . She says she feels the urge to void but was unable to initiate a stream. After 10 minutes and several interventions, she opted for an intermittent cath to drain the bladder.    Bladder scanned for 584. Straight cathed for 500 clear dark yellow urine. Pt tolerated well. PO fluids encouraged.

## 2022-07-10 NOTE — PROGRESS NOTES
Called in by nurse to assess oozing of serosanguinous fluid from the left apex of the incision.  When I arrived at the bedside, I removed the sterstips which had serosanguinous fluid on them.  The incision was closed with 4-0 Monocryl in a subcuticuolar fashion and the closure was intact.  Some drainage was expressed from the right side of the incision.  It appeared that the superficial skin edge was bleeding about 2 inches from the left apex of the incision.  A stapler was used to place 6 staples on the left apex of the incision and the bleeding stopped.   The steristrips were replaced along the length of the incision and a pressure dressing was applied.  The patient tolerated the incision care well.    Heaven Moya MD ....................  7/9/2022   11:16 PM , pager: [phone]

## 2022-07-10 NOTE — PROCEDURES
"POST OPERATIVE NOTE-IMMEDIATE   SECTION      Preoperative Diagnosis:  1. Intrauterine pregnancy, 38w5d  2. See problem list below  3. Indication for : breech presentation in labor    Postoperative Diagnosis:  1. Intrauterine pregnancy, 38w5d  2. Breech presentation in labor          Operative Procedure: Low segment transverse  Section   Double layer uterine closure.    Surgeon(s) and Assistants (if any): Surgeon(s):  Heaven Moya MD    OR Staff:  Nurse: Rosie Petty RN; Jie Rodriguez RN; Nolvia Wolfe RN  Circulator: Ana Good RN  Nurse Practitioner: Zaira Chong APRN CNP  Relief Circulator: Chandni Robles RN  Scrub Person: Erin Collins  Respiratory Therapist: Craig Christian RT    Drains:  Avila    Specimens:  none    Findings/Conclusions:    Baby is a female  APGARS:  8, 9  Weight: 6 pounds, 11.9 ounces    Normal uterus.  No uterine septum noted.  Fetus in alie breech with sacrum posterior.    Indications for Operation:  Patient Active Problem List   Diagnosis     Labor and delivery, indication for care     Normal labor     Breech presentation of fetus palpable vaginally     Status post        Estimated Blood Loss: 670 cc.    Condition on discharge from OR: Satisfactory    Procedure:   The patient was brought to the operating suite.  She had a avila catheter and an epidural in place since she had been laboring.  She was dilated 9.5 cm when the breech presentation and her labor had progressed rapidly.   Pneumatic boots were placed and the patient was prepped and draped.  A timeout for \"pause for the cause\" then occurred and all in attendance were in agreement with the planned procedure, as noted on the signed surgical consent form.     Description of the Operative Procedure:   After adequate level of anesthesia, the patient was prepped and draped in the usual fashion.  The anesthesia was tested, and was deemed to be " appropriate to proceed.     A pfannenstiel incision was made and carried down sharply through the subcutaneous tissue.  The fascia was identified and incised horizontally. The rectus abdominus muscles were bluntly and sharply dissected off the fascia superiorly and inferiorly to the pubic symphysis.  The peritoneum was identified and incised.  The incision was extended superiorly and inferiorly to the upper aspect of the bladder.  The vesico-uterine peritoneum was then identified and incised horizontally.    A horizontal incision was then made on the lower uterine segment and extended bilaterally in a curvilinear fashion.  The infant was then delivered from a alie breech presentation. The female infant was shown to her parents, the cord doubly clamped and cut, and the baby was taken to the warmer. The placenta was then delivered spontaneously and the uterine cavity was wiped free of remaining clot and membrane.  The uterine incision was then closed with 0-vicryl suture.  A second layer was placed, using 0- PDS, which imbricated the first layer. Surgicell was applied for additional hemostasis.  The uterine incision was inspected and noted to be hemostatic.      The fascia was then closed with 0-vicryl suture.  The subcutaneous tissues were then irrigated and hemostasis achieved with electrocautery.  The skin edges were then reapproximated using 4-0 Monocryl in a subcuticular fashion.    The patient tolerated the procedure well and was transferred to the recovery room in satisfactory condition, the infant was transferred to rooming in.    Specimens: none  Complications:  None apparent        Heaven Moya MD    7/9/2022   8:31 PM  PAGER:  [phone]

## 2022-07-10 NOTE — PLAN OF CARE
Pt is A&Ox3, VSS, up with SBA x2 this shift, abdominal precautions reviewed. Pt has been unable to void after avila removal.(See POC note) Nausea and itching have resolved with medications. Pain is well controlled with scheduled medications. IV is SL'd.     Report to Pura Wilson RN to assume care.

## 2022-07-10 NOTE — ANESTHESIA CARE TRANSFER NOTE
Patient: Chandni Ryan    Procedure: Procedure(s):   SECTION       Diagnosis: Indication for care in labor or delivery [O75.9]  Diagnosis Additional Information: No value filed.    Anesthesia Type:   Epidural     Note:    Oropharynx: oropharynx clear of all foreign objects  Level of Consciousness: awake  Oxygen Supplementation: room air    Independent Airway: airway patency satisfactory and stable  Dentition: dentition unchanged  Vital Signs Stable: post-procedure vital signs reviewed and stable  Report to RN Given: handoff report given  Patient transferred to: Labor and Delivery          Vitals:  Vitals Value Taken Time   BP     Temp     Pulse     Resp     SpO2         Electronically Signed By: SAMEER To CRNA  2022  8:13 PM

## 2022-07-10 NOTE — PLAN OF CARE
Data: Chandni Ryan transferred to [facility] via wheelchair at 2340. Baby transferred via parent's arms.  Action: Receiving unit notified of transfer: Yes. Patient and family notified of room change. Report given to Rosamaria Landeros RN at 2345. Belongings sent to receiving unit. Accompanied by Registered Nurse. Oriented patient to surroundings. Call light within reach. ID bands double-checked with receiving RN.  Response: Patient tolerated transfer and is stable.

## 2022-07-11 PROCEDURE — 120N000012 HC R&B POSTPARTUM

## 2022-07-11 PROCEDURE — 250N000013 HC RX MED GY IP 250 OP 250 PS 637: Performed by: SPECIALIST

## 2022-07-11 RX ADMIN — IBUPROFEN 800 MG: 400 TABLET, FILM COATED ORAL at 17:04

## 2022-07-11 RX ADMIN — SENNOSIDES AND DOCUSATE SODIUM 2 TABLET: 50; 8.6 TABLET ORAL at 20:58

## 2022-07-11 RX ADMIN — IBUPROFEN 800 MG: 400 TABLET, FILM COATED ORAL at 04:37

## 2022-07-11 RX ADMIN — ACETAMINOPHEN 975 MG: 325 TABLET ORAL at 04:37

## 2022-07-11 RX ADMIN — ACETAMINOPHEN 975 MG: 325 TABLET ORAL at 23:20

## 2022-07-11 RX ADMIN — OXYCODONE HYDROCHLORIDE 5 MG: 5 TABLET ORAL at 09:04

## 2022-07-11 RX ADMIN — SENNOSIDES AND DOCUSATE SODIUM 2 TABLET: 50; 8.6 TABLET ORAL at 09:05

## 2022-07-11 RX ADMIN — IBUPROFEN 800 MG: 400 TABLET, FILM COATED ORAL at 23:20

## 2022-07-11 RX ADMIN — ACETAMINOPHEN 975 MG: 325 TABLET ORAL at 10:42

## 2022-07-11 RX ADMIN — OXYCODONE HYDROCHLORIDE 5 MG: 5 TABLET ORAL at 13:34

## 2022-07-11 RX ADMIN — ACETAMINOPHEN 975 MG: 325 TABLET ORAL at 17:05

## 2022-07-11 RX ADMIN — OXYCODONE HYDROCHLORIDE 5 MG: 5 TABLET ORAL at 18:30

## 2022-07-11 RX ADMIN — IBUPROFEN 800 MG: 400 TABLET, FILM COATED ORAL at 10:42

## 2022-07-11 ASSESSMENT — ACTIVITIES OF DAILY LIVING (ADL)
ADLS_ACUITY_SCORE: 20

## 2022-07-11 NOTE — PLAN OF CARE
Doing well,vss,voiding with out difficulty,pain control with tylenol,ibuprofen&oxycodone,incision intact with staples&steri strips,baby breast feeding well.Will continue to monitor.

## 2022-07-11 NOTE — PROGRESS NOTES
"POST PARTUM NOTE,  SECTION    POST-OP DAY 2:  Delivery    Feels well overall, some incision soreness  Denies heavy bleeding.  Tolerating po.  Infant is well.          PE  /57 (BP Location: Left arm)   Pulse 56   Temp 98.2  F (36.8  C) (Oral)   Resp 16   Ht 1.676 m (5' 6\")   Wt 73 kg (161 lb)   SpO2 97%   Breastfeeding Unknown   BMI 25.99 kg/m  ,      Intake/Output Summary (Last 24 hours) at 2022 0938  Last data filed at 7/10/2022 2200  Gross per 24 hour   Intake 1120 ml   Output 1200 ml   Net -80 ml       General - appears well  Incision -  C/d/i,   Abdomen - soft, NT, ND        Hemoglobin   Date Value Ref Range Status   07/10/2022 9.4 (L) 11.7 - 15.7 g/dL Final         Impression:   POD #1 s/p  for breech, labor.  MRSA colonization.    Plan:    Routine postop care.  Anticipate discharge in am.  Consider staples removal in am  (see Dr. Moya's note , has subcuticular skin closure, but staples added later at bedside).  Pt will continue care at [facility] for MRSA  (per ID does monthly nasal minocycline + Hibiclens shower).        Munira Omalley MD  [facility]  22    "

## 2022-07-11 NOTE — LACTATION NOTE
"This note was copied from a baby's chart.  Initial lactation visit with SHIRA Tariq and baby girl. Chandni reports infant has been breastfeeding really well, able to latch independently. Parents state infant even \"slept for 4-5 hours overnight\" although nurse reminded them to not exceed 4 hour stretches for now. Discussed how to know if infant is getting enough, recommended use of feeding log until infant is gaining weight and back to birth weight. Discussed benefits of skin to skin. Encouraged exclusive breastfeeding for the first 3-4 weeks before offering pacifiers or bottles unless medically indicated. Family receptive to information. Will revisit as needed.   "

## 2022-07-11 NOTE — PLAN OF CARE
VSS on RA.  Fundus firm, midline, U/1.  Scant lochia rubra.  Incision pressure dressing removed. Incision WDL.  Pt is able to empty bladder independently. Voiding adequately.  Up independently.  Pain managed w/Tylenol, Ibuprofen, and Toradol.  Pt breastfeeding independently.  Bonding well w/infant.  Independent w/infant cares.  Spouse supportive at bedside.  Nursing to continue to monitor.

## 2022-07-12 VITALS
BODY MASS INDEX: 25.88 KG/M2 | HEIGHT: 66 IN | HEART RATE: 56 BPM | SYSTOLIC BLOOD PRESSURE: 118 MMHG | OXYGEN SATURATION: 97 % | WEIGHT: 161 LBS | DIASTOLIC BLOOD PRESSURE: 67 MMHG | TEMPERATURE: 98.2 F | RESPIRATION RATE: 16 BRPM

## 2022-07-12 PROBLEM — A49.02 MRSA INFECTION: Status: ACTIVE | Noted: 2022-07-11

## 2022-07-12 PROCEDURE — 250N000013 HC RX MED GY IP 250 OP 250 PS 637: Performed by: SPECIALIST

## 2022-07-12 RX ORDER — OXYCODONE HYDROCHLORIDE 5 MG/1
5 TABLET ORAL EVERY 4 HOURS PRN
Qty: 30 TABLET | Refills: 0 | Status: SHIPPED | OUTPATIENT
Start: 2022-07-12

## 2022-07-12 RX ORDER — ACETAMINOPHEN 500 MG
500-1000 TABLET ORAL EVERY 6 HOURS PRN
COMMUNITY
Start: 2022-07-12

## 2022-07-12 RX ORDER — IBUPROFEN 600 MG/1
600 TABLET, FILM COATED ORAL EVERY 6 HOURS PRN
Qty: 40 TABLET | Refills: 0 | Status: SHIPPED | OUTPATIENT
Start: 2022-07-12

## 2022-07-12 RX ORDER — DOCUSATE SODIUM 100 MG/1
100 CAPSULE, LIQUID FILLED ORAL 2 TIMES DAILY PRN
COMMUNITY
Start: 2022-07-12

## 2022-07-12 RX ADMIN — OXYCODONE HYDROCHLORIDE 5 MG: 5 TABLET ORAL at 08:40

## 2022-07-12 RX ADMIN — IBUPROFEN 800 MG: 400 TABLET, FILM COATED ORAL at 11:21

## 2022-07-12 RX ADMIN — ACETAMINOPHEN 975 MG: 325 TABLET ORAL at 05:36

## 2022-07-12 RX ADMIN — OXYCODONE HYDROCHLORIDE 5 MG: 5 TABLET ORAL at 12:55

## 2022-07-12 RX ADMIN — IBUPROFEN 800 MG: 400 TABLET, FILM COATED ORAL at 05:36

## 2022-07-12 RX ADMIN — SENNOSIDES AND DOCUSATE SODIUM 2 TABLET: 50; 8.6 TABLET ORAL at 08:40

## 2022-07-12 RX ADMIN — ACETAMINOPHEN 975 MG: 325 TABLET ORAL at 11:21

## 2022-07-12 ASSESSMENT — ACTIVITIES OF DAILY LIVING (ADL)
ADLS_ACUITY_SCORE: 20

## 2022-07-12 NOTE — PLAN OF CARE
Doing well,vss,voiding with out difficulty,pain control with tylenol,ibuprofen&oxycodone,up independently in room,baby breast feeding well.Plan to discharge today&follow up in clinic in 2 weeks or sooner with any concerns.

## 2022-07-12 NOTE — PLAN OF CARE
VSS on RA.  Fundus firm, midline, U/1.  Scant lochia rubra.  Incision WDL.  Pt is able to empty bladder independently. Voiding adequately.  Up independently.  Pain managed w/Tylenol and Ibuprofen. Contact precautions maintained. Pt breastfeeding independently.  Bonding well w/infant.  Independent w/infant cares.  Spouse supportive at bedside.  Nursing to continue to monitor.

## 2022-07-12 NOTE — PROGRESS NOTES
"POSTPARTUM PROGRESS NOTE-- POD#3    27 year old  s/p pLTCS for breech in labor    Patient Active Problem List   Diagnosis     Labor and delivery, indication for care     Normal labor     Breech presentation of fetus palpable vaginally     Status post      MRSA infection       Subjective:     No complaints.  Pain controlled on Motrin, Tylenol, oxycodone.  Lochia light.  Tolerating general diet.  Voiding freely.  +flatus/-BM.    Objective:     /66 (BP Location: Left arm)   Pulse 56   Temp 98.5  F (36.9  C) (Oral)   Resp 16   Ht 1.676 m (5' 6\")   Wt 73 kg (161 lb)   SpO2 97%   Breastfeeding Unknown   BMI 25.99 kg/m      General: well developed, well nourished female in no acute distress. Alert, oriented, cooperative. Affect is appropriate.  Abdomen: soft, appropriately tender. Fundus firm at umbilicus  Incision: clean/dry/intact with steris  Legs: nontender, trace edema bilaterally    Hgb 9.4    Assessment:     POD#3, recovering well  Patient Active Problem List   Diagnosis     Labor and delivery, indication for care     Normal labor     Breech presentation of fetus palpable vaginally     Status post      MRSA infection       Plan:     - ambulate  - routine postpartum care  - discharge home-- instructions, follow up reviewed.  Patient to follow up with primary OB provider for PP care.  -  +    Sonia Sosa MD  "

## 2022-07-12 NOTE — DISCHARGE SUMMARY
[facility] Discharge Summary    Chandni Ryan MRN# [id number]   Age: 27 year old YOB: 1995     Date of Admission:  2022  Date of Discharge::  2022  Admitting Physician:  Heaven Moya MD  Discharge Physician:  Sonia Sosa MD     Home clinic: [facility]          Admission Diagnoses:   Labor and delivery, indication for care [O75.9]  Normal labor [O80, Z37.9]  Breech presentation          Discharge Diagnosis:   S/p  section          Procedures:   Procedure(s): Primary low transverse  section       No procedures performed during this admission           Medications Prior to Admission:     Medications Prior to Admission   Medication Sig Dispense Refill Last Dose     albuterol (PROAIR HFA/PROVENTIL HFA/VENTOLIN HFA) 108 (90 Base) MCG/ACT inhaler Inhale 2 puffs into the lungs every 6 hours        chlorhexidine (HIBICLENS) 4 % liquid Apply topically daily as needed for wound care Uses Hibiclens the first of the month        doxylamine (UNISOM) 25 MG TABS tablet Take 25 mg by mouth At Bedtime   2022 at Unknown time     famotidine (PEPCID) 20 MG tablet Take 20 mg by mouth 2 times daily   2022 at Unknown time     Prenatal Vit-Fe Fumarate-FA (PRENATAL MULTIVITAMIN W/IRON) 27-0.8 MG tablet Take 1 tablet by mouth daily   2022 at Unknown time     [DISCONTINUED] Acetaminophen 325 MG CAPS Take 325-650 mg by mouth every 4 hours as needed   2022 at Unknown time     [DISCONTINUED] minocycline (DYNACIN) 100 MG tablet Take 100 mg by mouth 2 times daily Takes BID for the first 5 days of every month                Discharge Medications:     Current Discharge Medication List      START taking these medications    Details   acetaminophen (TYLENOL) 500 MG tablet Take 1-2 tablets (500-1,000 mg) by mouth every 6 hours as needed for mild pain    Associated Diagnoses: Status post       docusate sodium (COLACE) 100 MG capsule Take 1 capsule (100 mg)  by mouth 2 times daily as needed for constipation    Associated Diagnoses: Status post       ibuprofen (ADVIL/MOTRIN) 600 MG tablet Take 1 tablet (600 mg) by mouth every 6 hours as needed for other (cramping)  Qty: 40 tablet, Refills: 0    Associated Diagnoses: Status post       oxyCODONE (ROXICODONE) 5 MG tablet Take 1 tablet (5 mg) by mouth every 4 hours as needed for moderate to severe pain  Qty: 30 tablet, Refills: 0    Associated Diagnoses: Status post          CONTINUE these medications which have NOT CHANGED    Details   albuterol (PROAIR HFA/PROVENTIL HFA/VENTOLIN HFA) 108 (90 Base) MCG/ACT inhaler Inhale 2 puffs into the lungs every 6 hours      chlorhexidine (HIBICLENS) 4 % liquid Apply topically daily as needed for wound care Uses Hibiclens the first of the month      doxylamine (UNISOM) 25 MG TABS tablet Take 25 mg by mouth At Bedtime      famotidine (PEPCID) 20 MG tablet Take 20 mg by mouth 2 times daily      Prenatal Vit-Fe Fumarate-FA (PRENATAL MULTIVITAMIN W/IRON) 27-0.8 MG tablet Take 1 tablet by mouth daily         STOP taking these medications       Acetaminophen 325 MG CAPS Comments:   Reason for Stopping:         minocycline (DYNACIN) 100 MG tablet Comments:   Reason for Stopping:                     Consultations:   No consultations were requested during this admission          Brief History of Labor or Admission:   28yo  at 38w5d admitted in labor.  She received labor epidural and progressed rapidly to 9.5cm.  SVE following epidural revealed breech presentation.  Patient was consented for  section.  Procedure was performed and uncomplicated.  See operative note for details.           Hospital Course:   The patient's hospital course was unremarkable.  She recovered as anticipated and experienced no post-operative complications. On discharge, her pain was well controlled. Vaginal bleeding is similar to peak menstrual flow.  Voiding without difficulty.   Ambulating well and tolerating a normal diet.  No fever or significant wound drainage.  Breastfeeding well.  Infant is stable.  No bowel movement yet.  She was discharged on post-partum day #3.    Post-partum hemoglobin:   Hemoglobin   Date Value Ref Range Status   07/10/2022 9.4 (L) 11.7 - 15.7 g/dL Final             Discharge Instructions and Follow-Up:   Discharge diet: Regular   Discharge activity: Lifting restricted to 10-15 pounds  No driving for 2 week(s)  Pelvic rest: abstain from intercourse and do not use tampons for 6 week(s)   Discharge follow-up: Follow up with primary OB provider in 2 weeks   Wound care: Keep wound clean and dry           Discharge Disposition:   Discharged to home      Attestation:  I have reviewed today's vital signs, notes, medications, labs and imaging.    Sonia Sosa MD

## 2022-11-19 ENCOUNTER — HEALTH MAINTENANCE LETTER (OUTPATIENT)
Age: 27
End: 2022-11-19

## 2024-01-27 ENCOUNTER — HEALTH MAINTENANCE LETTER (OUTPATIENT)
Age: 29
End: 2024-01-27

## 2025-02-01 ENCOUNTER — HEALTH MAINTENANCE LETTER (OUTPATIENT)
Age: 30
End: 2025-02-01

## (undated) DEVICE — SOL NACL 0.9% IRRIG 1000ML BOTTLE 07138-09

## (undated) DEVICE — SU VICRYL 0 CT 36" J358H

## (undated) DEVICE — SURGICEL POWDER ABSORBABLE HEMOSTAT 3GM 3013SP

## (undated) DEVICE — SU PDS II 0 CT 36" Z358T

## (undated) DEVICE — PACK C-SECTION LF PL15OTA83B

## (undated) DEVICE — SU DERMABOND MINI DHVM12

## (undated) DEVICE — DRSG STERI STRIP 1/2X4" R1547

## (undated) DEVICE — GLOVE PROTEXIS W/NEU-THERA 6.0  2D73TE60

## (undated) DEVICE — BLADE CLIPPER 4406

## (undated) DEVICE — GLOVE PROTEXIS W/NEU-THERA 6.5  2D73TE65

## (undated) DEVICE — SOL WATER IRRIG 1000ML BOTTLE 2F7114

## (undated) DEVICE — LINEN C-SECTION 5415

## (undated) DEVICE — PREP CHLORAPREP 26ML TINTED ORANGE  260815

## (undated) DEVICE — SUCTION CANISTER MEDIVAC LINER 3000ML W/LID 65651-530

## (undated) DEVICE — DRSG TELFA ISLAND 4X10"

## (undated) DEVICE — ESU GROUND PAD UNIVERSAL W/O CORD

## (undated) RX ORDER — OXYTOCIN/0.9 % SODIUM CHLORIDE 30/500 ML
PLASTIC BAG, INJECTION (ML) INTRAVENOUS
Status: DISPENSED
Start: 2022-07-09

## (undated) RX ORDER — MORPHINE SULFATE 1 MG/ML
INJECTION, SOLUTION EPIDURAL; INTRATHECAL; INTRAVENOUS
Status: DISPENSED
Start: 2022-07-09

## (undated) RX ORDER — LIDOCAINE HCL/EPINEPHRINE/PF 2%-1:200K
VIAL (ML) INJECTION
Status: DISPENSED
Start: 2022-07-09